# Patient Record
Sex: MALE | Race: WHITE | NOT HISPANIC OR LATINO | URBAN - METROPOLITAN AREA
[De-identification: names, ages, dates, MRNs, and addresses within clinical notes are randomized per-mention and may not be internally consistent; named-entity substitution may affect disease eponyms.]

---

## 2017-03-28 ENCOUNTER — EMERGENCY (EMERGENCY)
Facility: HOSPITAL | Age: 82
LOS: 1 days | Discharge: HOME | End: 2017-03-28
Attending: EMERGENCY MEDICINE | Admitting: EMERGENCY MEDICINE
Payer: MEDICARE

## 2017-03-28 VITALS
RESPIRATION RATE: 12 BRPM | TEMPERATURE: 98 F | HEIGHT: 67 IN | DIASTOLIC BLOOD PRESSURE: 98 MMHG | OXYGEN SATURATION: 99 % | HEART RATE: 106 BPM | WEIGHT: 145.06 LBS | SYSTOLIC BLOOD PRESSURE: 159 MMHG

## 2017-03-28 VITALS
OXYGEN SATURATION: 98 % | RESPIRATION RATE: 16 BRPM | SYSTOLIC BLOOD PRESSURE: 150 MMHG | DIASTOLIC BLOOD PRESSURE: 89 MMHG | TEMPERATURE: 98 F | HEART RATE: 95 BPM

## 2017-03-28 DIAGNOSIS — R79.9 ABNORMAL FINDING OF BLOOD CHEMISTRY, UNSPECIFIED: ICD-10-CM

## 2017-03-28 LAB
ANION GAP SERPL CALC-SCNC: 5 MMOL/L — SIGNIFICANT CHANGE UP (ref 5–17)
BASOPHILS # BLD AUTO: 0.1 K/UL — SIGNIFICANT CHANGE UP (ref 0–0.2)
BASOPHILS NFR BLD AUTO: 1.1 % — SIGNIFICANT CHANGE UP (ref 0–2)
BUN SERPL-MCNC: 27 MG/DL — HIGH (ref 7–23)
CALCIUM SERPL-MCNC: 8.9 MG/DL — SIGNIFICANT CHANGE UP (ref 8.5–10.1)
CHLORIDE SERPL-SCNC: 109 MMOL/L — HIGH (ref 96–108)
CO2 SERPL-SCNC: 32 MMOL/L — HIGH (ref 22–31)
CREAT SERPL-MCNC: 1.1 MG/DL — SIGNIFICANT CHANGE UP (ref 0.5–1.3)
EOSINOPHIL # BLD AUTO: 0 K/UL — SIGNIFICANT CHANGE UP (ref 0–0.5)
EOSINOPHIL NFR BLD AUTO: 0.6 % — SIGNIFICANT CHANGE UP (ref 0–6)
GLUCOSE SERPL-MCNC: 103 MG/DL — HIGH (ref 70–99)
HCT VFR BLD CALC: 43.5 % — SIGNIFICANT CHANGE UP (ref 39–50)
HGB BLD-MCNC: 14 G/DL — SIGNIFICANT CHANGE UP (ref 13–17)
LYMPHOCYTES # BLD AUTO: 1.4 K/UL — SIGNIFICANT CHANGE UP (ref 1–3.3)
LYMPHOCYTES # BLD AUTO: 17.3 % — SIGNIFICANT CHANGE UP (ref 13–44)
MCHC RBC-ENTMCNC: 30.1 PG — SIGNIFICANT CHANGE UP (ref 27–34)
MCHC RBC-ENTMCNC: 32.1 GM/DL — SIGNIFICANT CHANGE UP (ref 32–36)
MCV RBC AUTO: 93.6 FL — SIGNIFICANT CHANGE UP (ref 80–100)
MONOCYTES # BLD AUTO: 0.6 K/UL — SIGNIFICANT CHANGE UP (ref 0–0.9)
MONOCYTES NFR BLD AUTO: 6.8 % — SIGNIFICANT CHANGE UP (ref 1–9)
NEUTROPHILS # BLD AUTO: 6.1 K/UL — SIGNIFICANT CHANGE UP (ref 1.8–7.4)
NEUTROPHILS NFR BLD AUTO: 74.2 % — SIGNIFICANT CHANGE UP (ref 43–77)
PLATELET # BLD AUTO: 681 K/UL — HIGH (ref 150–400)
POTASSIUM SERPL-MCNC: 4.7 MMOL/L — SIGNIFICANT CHANGE UP (ref 3.5–5.3)
POTASSIUM SERPL-SCNC: 4.7 MMOL/L — SIGNIFICANT CHANGE UP (ref 3.5–5.3)
RBC # BLD: 4.64 M/UL — SIGNIFICANT CHANGE UP (ref 4.2–5.8)
RBC # FLD: 13.2 % — SIGNIFICANT CHANGE UP (ref 10.3–14.5)
SODIUM SERPL-SCNC: 146 MMOL/L — HIGH (ref 135–145)
WBC # BLD: 8.3 K/UL — SIGNIFICANT CHANGE UP (ref 3.8–10.5)
WBC # FLD AUTO: 8.3 K/UL — SIGNIFICANT CHANGE UP (ref 3.8–10.5)

## 2017-03-28 PROCEDURE — 93005 ELECTROCARDIOGRAM TRACING: CPT

## 2017-03-28 PROCEDURE — 85027 COMPLETE CBC AUTOMATED: CPT

## 2017-03-28 PROCEDURE — 99283 EMERGENCY DEPT VISIT LOW MDM: CPT

## 2017-03-28 PROCEDURE — 80048 BASIC METABOLIC PNL TOTAL CA: CPT

## 2017-03-28 PROCEDURE — 99283 EMERGENCY DEPT VISIT LOW MDM: CPT | Mod: 25

## 2017-03-28 RX ORDER — METOPROLOL TARTRATE 50 MG
1 TABLET ORAL
Qty: 0 | Refills: 0 | COMMUNITY

## 2017-03-28 RX ORDER — FONDAPARINUX SODIUM 2.5 MG/.5ML
1 INJECTION, SOLUTION SUBCUTANEOUS
Qty: 0 | Refills: 0 | COMMUNITY

## 2017-03-28 RX ORDER — FUROSEMIDE 40 MG
0.5 TABLET ORAL
Qty: 0 | Refills: 0 | COMMUNITY

## 2017-03-28 NOTE — ED PROVIDER NOTE - PLAN OF CARE
Return to the ED for any new or worsening symptoms  Take your medication as previously prescribed  Follow up with your PMD within the week as your Lasix may be causing a mild dehydration   Advance activity as tolerated

## 2017-03-28 NOTE — ED PROVIDER NOTE - CHPI ED SYMPTOMS NEG
no chills/no pain/no fever/no loss of consciousness/no headache/no dizziness/no nausea/no back pain/no decreased eating/drinking/no vomiting

## 2017-03-28 NOTE — ED PROVIDER NOTE - PROGRESS NOTE DETAILS
Results of labs and images reviewed with pt and son at bedside.  No evidence of hyperkalemia at this time.  No further complaints.  Will discharge home with outpatient follow up.  All questions answered.  Copy of blood work provided

## 2017-03-28 NOTE — ED PROVIDER NOTE - CARE PLAN
Principal Discharge DX:	Abnormal laboratory test result  Instructions for follow-up, activity and diet:	Return to the ED for any new or worsening symptoms  Take your medication as previously prescribed  Follow up with your PMD within the week as your Lasix may be causing a mild dehydration   Advance activity as tolerated  Secondary Diagnosis:	Afib

## 2017-03-28 NOTE — ED PROVIDER NOTE - OBJECTIVE STATEMENT
Pt is a 82 yo male who presents to the ED for abnormal blood work.  Pt reports that he had routine lab work drawn yesterday.  Today he was notified that his potassium was 6.1 and that he needed to go to the ED for further work up.  Denies HA, N/V/D/C, CP, SOB, abd pain, ext numbness or weakness.  Pt is on Metoprolol for A-fib, Xarelto, and 20 mg of Lasix.

## 2022-05-20 PROBLEM — Z00.00 ENCOUNTER FOR PREVENTIVE HEALTH EXAMINATION: Status: ACTIVE | Noted: 2022-05-20

## 2022-08-22 NOTE — ED ADULT TRIAGE NOTE - BMI (KG/M2)
Patient: Ross Platt    YOB: 1984    Date: 08/22/2022    Primary Care Provider: Cristy Schmidt APRN    Procedure:  Ross Platt presents for excision of abnormal skin lesion on his right upper abdomen/lower chest.     The risks, benefits, complications, and possible alternatives of the above procedure were discussed with the patient who agreed to proceed.    The area was prepped with betadine and draped in sterile fashion. The area was infiltrated with 1% Lidocaine. An elliptical incision was made encompassing the lesion and a rim of normal tissue. The lesion was dissected free with sharp dissection. It was marked with a stitch on the right lateral aspect. Hemostasis was obtained. The incision was closed with interrupted 3-0 vicryl dermal sutures followed by a running 4-0 vicryl subcuticular. It was dressed with mastisol, steristrips, guaze and tegaderm. The patient tolerated the procedure well.      Findings: 3.5 cm x 1.25cm skin lesion     ASSESSMENT/PLAN:    Diagnoses and all orders for this visit:    1. Skin lesion of chest wall (Primary)  -     Tissue Pathology Exam; Future  -     Tissue Pathology Exam    2. Nicotine dependence, cigarettes, uncomplicated      Follow up in 2 weeks.     Electronically signed by Rhiannon Cabrera MD  08/22/22  14:11 CDT                  
22.7
